# Patient Record
Sex: FEMALE | Race: WHITE | Employment: OTHER | URBAN - METROPOLITAN AREA
[De-identification: names, ages, dates, MRNs, and addresses within clinical notes are randomized per-mention and may not be internally consistent; named-entity substitution may affect disease eponyms.]

---

## 2019-11-04 ENCOUNTER — OFFICE VISIT (OUTPATIENT)
Dept: CONSULT | Facility: CLINIC | Age: 29
End: 2019-11-04
Attending: GENETIC COUNSELOR, MS
Payer: COMMERCIAL

## 2019-11-04 ENCOUNTER — OFFICE VISIT (OUTPATIENT)
Dept: CONSULT | Facility: CLINIC | Age: 29
End: 2019-11-04
Attending: PEDIATRICS
Payer: COMMERCIAL

## 2019-11-04 ENCOUNTER — OFFICE VISIT (OUTPATIENT)
Dept: CONSULT | Facility: CLINIC | Age: 29
End: 2019-11-04

## 2019-11-04 VITALS
BODY MASS INDEX: 19.18 KG/M2 | HEART RATE: 66 BPM | DIASTOLIC BLOOD PRESSURE: 66 MMHG | WEIGHT: 126.54 LBS | SYSTOLIC BLOOD PRESSURE: 105 MMHG | HEIGHT: 68 IN

## 2019-11-04 DIAGNOSIS — Z82.79 FAMILY HISTORY OF CONGENITAL OR GENETIC CONDITION: ICD-10-CM

## 2019-11-04 DIAGNOSIS — E75.21 FABRY DISEASE (H): Primary | ICD-10-CM

## 2019-11-04 DIAGNOSIS — R89.8 ABNORMAL GENETIC TEST: ICD-10-CM

## 2019-11-04 DIAGNOSIS — D23.9 ANGIOKERATOMA: ICD-10-CM

## 2019-11-04 DIAGNOSIS — D23.9 ANGIOKERATOMA: Primary | ICD-10-CM

## 2019-11-04 LAB
ALBUMIN SERPL-MCNC: 4.3 G/DL (ref 3.4–5)
ALP SERPL-CCNC: 56 U/L (ref 40–150)
ALT SERPL W P-5'-P-CCNC: 18 U/L (ref 0–50)
ANION GAP SERPL CALCULATED.3IONS-SCNC: 6 MMOL/L (ref 3–14)
AST SERPL W P-5'-P-CCNC: 15 U/L (ref 0–45)
BASOPHILS # BLD AUTO: 0 10E9/L (ref 0–0.2)
BASOPHILS NFR BLD AUTO: 0.1 %
BILIRUB SERPL-MCNC: 2.6 MG/DL (ref 0.2–1.3)
BUN SERPL-MCNC: 15 MG/DL (ref 7–30)
CALCIUM SERPL-MCNC: 9.2 MG/DL (ref 8.5–10.1)
CHLORIDE SERPL-SCNC: 103 MMOL/L (ref 94–109)
CHOLEST SERPL-MCNC: 161 MG/DL
CO2 SERPL-SCNC: 30 MMOL/L (ref 20–32)
CREAT SERPL-MCNC: 0.64 MG/DL (ref 0.52–1.04)
CREAT UR-MCNC: 123 MG/DL
DIFFERENTIAL METHOD BLD: NORMAL
EOSINOPHIL # BLD AUTO: 0.1 10E9/L (ref 0–0.7)
EOSINOPHIL NFR BLD AUTO: 1.8 %
ERYTHROCYTE [DISTWIDTH] IN BLOOD BY AUTOMATED COUNT: 12.6 % (ref 10–15)
FERRITIN SERPL-MCNC: 30 NG/ML (ref 12–150)
GFR SERPL CREATININE-BSD FRML MDRD: >90 ML/MIN/{1.73_M2}
GLUCOSE SERPL-MCNC: 91 MG/DL (ref 70–99)
HCT VFR BLD AUTO: 39.1 % (ref 35–47)
HDLC SERPL-MCNC: 72 MG/DL
HGB BLD-MCNC: 12.9 G/DL (ref 11.7–15.7)
IMM GRANULOCYTES # BLD: 0 10E9/L (ref 0–0.4)
IMM GRANULOCYTES NFR BLD: 0.1 %
IRON SATN MFR SERPL: 28 % (ref 15–46)
IRON SERPL-MCNC: 99 UG/DL (ref 35–180)
LDLC SERPL CALC-MCNC: 74 MG/DL
LYMPHOCYTES # BLD AUTO: 1.7 10E9/L (ref 0.8–5.3)
LYMPHOCYTES NFR BLD AUTO: 21.6 %
MAGNESIUM SERPL-MCNC: 2.2 MG/DL (ref 1.6–2.3)
MCH RBC QN AUTO: 29.9 PG (ref 26.5–33)
MCHC RBC AUTO-ENTMCNC: 33 G/DL (ref 31.5–36.5)
MCV RBC AUTO: 91 FL (ref 78–100)
MICROALBUMIN UR-MCNC: 11 MG/L
MICROALBUMIN/CREAT UR: 8.62 MG/G CR (ref 0–25)
MONOCYTES # BLD AUTO: 0.4 10E9/L (ref 0–1.3)
MONOCYTES NFR BLD AUTO: 5 %
NEUTROPHILS # BLD AUTO: 5.6 10E9/L (ref 1.6–8.3)
NEUTROPHILS NFR BLD AUTO: 71.4 %
NONHDLC SERPL-MCNC: 89 MG/DL
NRBC # BLD AUTO: 0 10*3/UL
NRBC BLD AUTO-RTO: 0 /100
PHOSPHATE SERPL-MCNC: 4.1 MG/DL (ref 2.5–4.5)
PLATELET # BLD AUTO: 190 10E9/L (ref 150–450)
POTASSIUM SERPL-SCNC: 3.5 MMOL/L (ref 3.4–5.3)
PROT SERPL-MCNC: 7.5 G/DL (ref 6.8–8.8)
RBC # BLD AUTO: 4.32 10E12/L (ref 3.8–5.2)
SODIUM SERPL-SCNC: 139 MMOL/L (ref 133–144)
TIBC SERPL-MCNC: 348 UG/DL (ref 240–430)
TRIGL SERPL-MCNC: 75 MG/DL
WBC # BLD AUTO: 7.8 10E9/L (ref 4–11)

## 2019-11-04 PROCEDURE — 40000738 ZZHCL STATISTIC FABRAZYME GL-3: Performed by: PEDIATRICS

## 2019-11-04 PROCEDURE — 40001087 ZZHCL STATISTIC LYSO GL3: Performed by: PEDIATRICS

## 2019-11-04 PROCEDURE — 80053 COMPREHEN METABOLIC PANEL: CPT | Performed by: PEDIATRICS

## 2019-11-04 PROCEDURE — 80061 LIPID PANEL: CPT | Performed by: PEDIATRICS

## 2019-11-04 PROCEDURE — 82306 VITAMIN D 25 HYDROXY: CPT | Performed by: PEDIATRICS

## 2019-11-04 PROCEDURE — 82043 UR ALBUMIN QUANTITATIVE: CPT | Performed by: PEDIATRICS

## 2019-11-04 PROCEDURE — 83540 ASSAY OF IRON: CPT | Performed by: PEDIATRICS

## 2019-11-04 PROCEDURE — 85025 COMPLETE CBC W/AUTO DIFF WBC: CPT | Performed by: PEDIATRICS

## 2019-11-04 PROCEDURE — 96040 ZZH GENETIC COUNSELING, EACH 30 MINUTES: CPT | Mod: ZF | Performed by: GENETIC COUNSELOR, MS

## 2019-11-04 PROCEDURE — 83735 ASSAY OF MAGNESIUM: CPT | Performed by: PEDIATRICS

## 2019-11-04 PROCEDURE — 84100 ASSAY OF PHOSPHORUS: CPT | Performed by: PEDIATRICS

## 2019-11-04 PROCEDURE — 36415 COLL VENOUS BLD VENIPUNCTURE: CPT | Performed by: PEDIATRICS

## 2019-11-04 PROCEDURE — 82728 ASSAY OF FERRITIN: CPT | Performed by: PEDIATRICS

## 2019-11-04 PROCEDURE — 83550 IRON BINDING TEST: CPT | Performed by: PEDIATRICS

## 2019-11-04 ASSESSMENT — PAIN SCALES - GENERAL: PAINLEVEL: NO PAIN (0)

## 2019-11-04 ASSESSMENT — MIFFLIN-ST. JEOR: SCORE: 1349.25

## 2019-11-04 NOTE — PROGRESS NOTES
"            Advanced Therapies  [facility] [address] [place] [address]  Phone: [phone]  Fax: [phone]  Date: 2019      Patient:  Chinyere Gonzalez   :   1990   MRN:     [id number]      Chinyere Gonzalez  [address] [place] [address]    Dear Dr. Atkins and Ms.  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    PAST MEDICAL HISTORY:    From the oral history, and medical records that are available, these items are noted:    Patient Active Problem List    Diagnosis Date Noted     Angiokeratoma 2019     Priority: Medium       Chinyere and her sister were sent because of discovery of this Fabry disease mutation, when that is usually very mild or attenuated.  In fact, Chinyere and her sister are not known to have any signs or symptoms of Fabry disease at this point in time.  Nonetheless, they are appropriately considering therapy and wanted expert opinion regarding the status of the condition, the prognosis, and therapies are currently available.    FAMILY HISTORY: A brief family medical history was reviewed.  REVIEW OF SYSTEMS: The review of systems negative for new eye, ear, heart, lung, liver, spleen, gastrointestinal, bone, muscle, integumentary, endocrinologic, brain or psychiatric issues except as noted above.  PHYSICAL EXAMINATION:   Medications:  No current outpatient medications on file.     No current facility-administered medications for this visit.        Allergies:  Allergies   Allergen Reactions     Inderal [Propranolol]      Latex        Physical Examination:  Blood pressure 105/66, pulse 66, height 5' 8.43\" (173.8 cm), weight 126 lb 8.7 oz (57.4 kg), head circumference 55.8 cm (21.97\").  Weight %tile:Normalized weight-for-age data not available for patients older " than 20 years.  Height %tile: Normalized stature-for-age data not available for patients older than 20 years.  Head Circumference %tile: No head circumference on file for this encounter.  BMI %tile: Normalized BMI data available only for age 0 to 20 years.    General: The patient is oriented to person, place and time at an age-appropriate manner.   HEENT: The facial features are normal and symmetric. The ears are of normal position and configuration and hearing is grossly normal.  The oropharynx is benign and the tongue protrudes normally without fasciculations.  Neck: The neck is supple with full range of motion  Chest: The chest is of normal configuration and clear by auscultation.   Heart: A normal, regular S1 and S2 heart sounds are heard without murmurs or gallops.  Abdomen: The abdomen is soft and benign without organomegaly.   Extremities: The extremities are of normal configuration without contractures nor hyperlaxities.   Integument: The integument is  of normal appearance without significant changes in pigmentation, birthmarks, or lesions.  Neuromuscular:  Mental Status Exam:  Alert, awake. Fully oriented. No dysarthria, no dysphasia. Speech of normal fluency.  Cranial Nerves:  PERRLA, EOMs intact, no nystagmus, facial movements symmetric. No atrophy or fasciculations.    Motor:  Normal tone in all four extremities, no atrophy or fasciculations. 5/5 strength bilaterally in shoulder abduction, elbow extensors and flexors, , hip flexors, knee extensors and flexors. No tremors.  Sensory:  Negative Romberg.  Reflexes:  2+ and symmetric in biceps, patellar, Achilles; There is no clonus at the ankles.  Gait:  Normal gait; normal arm swing and stance.ankles.    LABORATORY RESULTS: Laboratory studies from the past year were reviewed.     ASSESSMENT:  1. Finding of the A143TC mutation, a possible benign variant versus very low and delayed and often mild/attentuated symptoms of Fabry disease.  2. Currently, no  signs or concerns about Fabry disease.    PLAN/RECOMMENDATIONS:  1. Pharmacotherapy for Inherited Metabolic Disorders (PIMD) consultation with Radha Atkins PharmD.  2. No therapy at this time  3. Return to clinic in 12 months, or as needed if questions or concerns come to attention.      There will be an informational meeting with experts who are knowledgeable about your condition --- the annual [employer] event --- on the morning of Saturday, October 19, 2019 at the [facility] ([facility], [address] [place] [address]). You, and your family and friends are invited to next year's [employer] meeting! Please call Cecilia at [phone] for more information!    FOLLOW-UP INSTRUCTIONS FOR THE PATIENT:  If you are returning to clinic to review specific laboratory tests, please call the Genetic Counselor (see phone numbers below)  to confirm that we have received all of the results from reference laboratories prior to your appointment. If we have not received all of the test results, please discuss re-scheduling your appointment.    I spent 45 minutes face-to-face with the patient reviewing the chief complaint, past medical history, and obtaining a review of systems as well as doing a physical examination; more than 50% of this time was spent in counseling regarding the nature of Fabry disease, the difference between clinical manifestations in affected males versus women, the unique and complcated situation with the A143T mutation, need for ongoing evaluation, and possibility of intravenous or oral therapies if therapy is indicated..    With warmest regards,     Phuc Chauhan Ph.D., M.D.  Professor of Pediatrics  Medical Director, Advanced Therapies Program  Medical Director, PKU and Maternal PKU Clinic    Appointments: [phone]      Monday mornings: Advanced Therapies for Lysosomal Diseases Clinic   Monday afternoons: PKU Clinic, Metabolism Clinic,  and Genetics Clinic    Nurse Coordinator, Metabolism and Genetics:  Sharla Dunham, RN  [phone]    Pharmacotherapy Consultant:  Radha Atkins, PharmD, Pharmacotherapy for Metabolic Disorders (PIMD): [phone]  John Mcguire, PharmD, Pharmacotherapy for Metabolic Disorders (PIMD): [phone]    Genetic Counselor:  Alison Whitney MS, CGC (Genetic test Results): [phone]  Lexi Greenfield MS, GCG, (Genetic test results: [phone])    Metabolic Dietician:  Karime Elizondo, Registered Dietician: [phone]  Karishma Mendez, Registered Dietician: [phone]    :  BRYNN Christian, [facility], Clinical , [phone]    Advanced Therapies Clinic Scheduler:  Ting Meredith, [phone]      Copy to Primary Care Practitioner:      Copy  to patient:  Chinyere Gonzalez  [address]  [place] [address]

## 2019-11-04 NOTE — NURSING NOTE
"Chief Complaint   Patient presents with     Consult     Fabry        /66 (BP Location: Right arm, Patient Position: Sitting, Cuff Size: Adult Regular)   Pulse 66   Ht 5' 8.43\" (173.8 cm)   Wt 126 lb 8.7 oz (57.4 kg)   HC 55.8 cm (21.97\")   BMI 19.00 kg/m      Joe Carter LPN    "

## 2019-11-04 NOTE — PROGRESS NOTES
Pharmacotherapy Visit    : 1990  Date of visit: 2019      Conditions and Associated Medications    1) Fabry disease    Fabry Genotype: p.A143T    Alpha galactosidase tissue (e.g., leukocyte) activity level (unknown at this time)      Biomarkers:        Plasma GL3 (globotriaosylceramide) (mcg/ml):  (normal range is 7 mcg/ml or less)  19: pending      Lyso GL3 (normal range is < 5 mcg/ml):     19: pending      Urine protein (corrected for urine creatinine) (reference: 0-0.2 g/gCr)_:     19: pending      Urine albumin (corrected for urine creatinine)(reference: 0-25 mg/gCr):     19: pending      General background information on Fabry disease:    Fabry disease is inherited through an x-linked pattern of inheritance of Fabry disease.  Fabry disease results from mutations to the GLA gene encoding for alpha-galactosidase enzyme, the enzyme needed to metabolize GL-3 (globotriaosylceramide).  Deficiency in alpha-galactosidase enzyme activity results in accumulation of GL-3 (globotriaosylceramide) in tissues throughout the body, with most problematic accumulation often associated with kidney podocyte accumulation of GL-3, blood vessel endothelium accumulation of GL-3.  Fabry disease also affect the small fibers of nervous system tissue, the gastrointestinal tract.  GL-3 accumulation in eye tissues may cause a cornea verticillata (also known as vortex keratopathy in some patients.  The eye findings, if present, may be viewed by a slit lamp.  Clinical conditions and symptoms that may be commonly associated with Fabry disease, including peripheral neuropathic pain and paresthesias that can be exacerbated by extremes of temperature, impaired sweating, fatigue, low exercise tolerance, angiokeratomas, corneal whorling, gastrointestinal symptoms (e.g., diarrhea and/or constipation, gastrointestinal pain, nausea), heart problems (e.g., left ventricular hypertrophy), kidney function impairment  (and probable involvement of podocytes in this process), migraines, dizziness, increased risk of stroke, hearing impairment, possible depression.         Overview of therapies for Fabry disease:    Intravenous enzyme replacement therapy (ERT):  The only FDA approved intravenous enzyme replacement therapy (ERT) is Fabrazyme (agalsidase beta), which is administered as an IV infusion, at 1 mg/kg/dose, once every 2 weeks. We also discussed that another ERT, Replagal (agalsidase alpha, made by [employer]), is available in [place]. Although Replagal and Fabrazyme are thought to be very similar, there are some differences that are notable. Fabrazyme is produced by a recombinant DNA technology using a cell line from a Chinese hamster ovary cell (CHO) from 1957.  Replagal is a humanized ERT, produce by a gene activation method using a human sarcoma cell line, [id number] which has been available since May 2000. Fabrazyme is dosed at 1 mg/kg/dose once every 2 weeks, which Replagal is dose at 0.2 mg/kg/dose every 2 weeks. The optimal dosing of ERT for Fabry disease has not been fully elucidated ed and it is not establised whether gene activation of a human cell line to create an ERT identical to the human agalsidase enzyme offers and advantage over CHO recombinant DNA derived ERT.   Pengunigalsidase Alpha (PRX-102) is a pegylated ERT that is made from a carrot cell line. It is currently in clinical trials ([facility] is a site for the clinical trial). The formulation is hypothesized to have improved cellular uptake and improved tissue distribution compared to the current licensed ERTs (Fabrazyme and Replagal). The pegylation is thought to prolong plasma half-life to such a degree, that pengunigalsidase may only half to be administered every 4 weeks (instead of every 2 weeks).  Side effects and symptoms of infusion reactions to ERT include acute reactions, delayed reactions, or bi-phasic reactions.  Anti-ERT antibodies to  ERT can develop.  Anti-ERT antibodies are usually considered to not interfere significantly with efficacy of ERT, but may increase risk of infusion reactions.  On rare occasions, patients may develop a neutralizing anti-ERT.  Anti-ERT antibodies are thought to be able to decrease efficacy of ERT by a number of possible mechanisms, including reduced enzyme stability and enzyme degadation in the blood stream, antibody-mediated blockade of M6P receptor cellular uptake of ERT, retargeting of enzyme to macrophages, intracellular misrouting of ERT.   If neutralizing anti-ERT antibodies form, then special treatment protocols may be put in place to eradicate the neutralizing anti-ERT antibodies.  (References:  Mervat FLOYD. Rossi CAZARES. Keyon BERNABE. Micheline BERNABE. Libra BERNABE. Navin ALANIS, Carrie BEATTY. Non-inhibitory antibodies impeded lysosomal storage reduction during enzyme replacement therapy of a lysosomal storage disease. Journal of Molecular Medicine. 86(4):433-42, 2008 Apr.; [place] CT. Stalin ARMIJO. Felipe GROVES. Nadir ALICEA.  Immune response to enzyme replacement therapy: 4-sulfatase epitope reactivity of plasma antibodies from MPS VI cats. Molecular Genetics and Metabolism. 67(3):194-205, 1999 Jul.)  We have discussed the rationale for using a slower ERT infusion versus a faster infusion, specifically to minimize risk of saturating the non-hepatic M6P receptors and thereby increase cellular uptake of ERT, as well as helping to minimize risk of infusion reactions. We also discussed the advantages and disadvantages of using and implanted port central line versus a peripherally accessed central line for ERT infusions.   B) Chaperone therapy: An oral medication just recently approved in [place] for Fabry disease, and for which FDA approval was received on 08/08/18 is called Galafold (generic name is migalastat, also referred to as [id number]).  This medication is made by [employer], and which functions as a mutation specific  pharmaceutical chaperone for residual alpha-galactosidase, to protect the residual enzyme from proteolytic degradation while increasing its half-life and enzyme activity.  It has demonstrated beneficial acitivity for 269 Fabry mutations. Juany s GAA mutation is amenable to migalastat.   .  C) Substrate reduction therapy (SRT): Eliglustat tartrate (or [id number], by [employer]), an inhibitor of a step in the glycosphingolipid pathway, in currently in clinical trials as a substrate reduction therapy for Gaucher disease, but has also been shown in mice to also reduce accumulation of globotriaosylceramide in Fabry disease mice.   D) Lucerstat is a substrate reducing therapy that may enter clinical trials in 2017 or 2018.  This agent belongs to [employer].  E) Some early research is being done on gene therapy for Fabry disease.     2) Cardiology    Chinyere had heart echo in 2016 which was normal;  Dr. Chauhan would like her to also have a cardiac MRI and EKG.    3) Neuropathic pain:    Chinyere said she does not think she has neuropathic pain, but she said she does have Raynauds syndrome in such cold weather her feet feel numb.  If she jumps in a hot shower, especially if her feet were cold, she will feel burning.      4) Headaches    Chinyere denies headaches, other than sinus headaches related to allergies    Headaches, especially migraines, are becoming increasingly recognized as being a common complaint in patients with Fabry disease.  White matter lesions like those found in patients suffering from recurrent migraine headaches, are often seen on brain MRI imaging in patients with Fabry disease.  Additionally, patients with Fabry disease often develop tortuosity of the blood vessels, including basilar artery tortuosity.  This cererbral vascular diseases may lead to increase risk of symptoms resembling a dolichoectasia like presentation, including vision changes, episodes of paresis and nolvia-facial muscle  weakness and other one-sided weaknesses, dizziness and headaches. In the general population, dolichoectasia with basilar artery tortuosity can lead to increased risk of TIA and stroke.  We discussed these possibilities. Although potential therapies for such symptoms are scant, we discussed using statin therapy to improve cerebral vascular flow and cerebral vascular reactivity and to learn if this might also help with mitigating headaches.     (References:      Annita M, Dominique U, Annie R, Radhames B, Roosevelt C, Sabine I, Juliocesar A, Justin M.The early clinical phenotype of Fabry disease: a study on 35  children and adolescents.  Dominic B, Vincent L, Del Patrizia M, Wendy C, Brenda G, Pieter C. Eur J Pediatr. 2003 Nov;162(11):767-72.   Fabry disease in patients with migraine with aura.  Neurol Sci. 2010 Yossi;31 Suppl 1:S167-9.   SUE Salas. Can we use statins to prevent stroke in Fabry disease? J Inherit Metab Dis (2009) 32: 481.    V Purvin, A Kawasaki, S Zeldes. Dolichoectatic arterial compression of the anterior visual pathways: neuro-ophthalmic features and clinical course. J Neurol Neurosurg Psychiatry 2004;75:27-32.  Humaira R, Fatuma RY, Jameso S, Haley V, Virgilio G, Efrain F, Karol M, Thad DP, Rosey MOSQUERA. Basilar Artery Changes in Fabry Disease. AJNR Am J Neuroradiol. 2017 Mar;38(3):531-536.).      5) No gastrointestinal symptoms:    Chinyere said her father, who has the Fabry mutation p.A143T, has stomach discomfort  and fast transit time, with diarrhea, after eating meals    6) Vitamin D status    Will check vitaimin D status today    Darryl et al. (2014) found in a study of 111 adult patients (ages 27-53 years) with Fabry diseases who did nto have severe renal function impairment, an association between vitamin D deficiency in patients with Fabry disease and higher lean left ventricular (LV) mass, higher median levels of proteinuria, prevalence of depression, hiwot, cornea verticillata and need  for medical pain therapy. Patients with Fabry disease who had moderate to overt vitamin D deficiency had more cardiac hypertrophy than patients with sufficient vitamin D status. The authors conclulded that vitamin D deficiency was strongly associated with cardiomyopathy and adverse clinical symptomsin pateints with Fabry disease.       Aside from development of severe renal function impairments that can interfere with normal vitamin homeostasis, patients with Fabry disease who do not have severe renal function impairment may have a higher prevalence of vitamin D deficiency compared to the general population.  This may be due to difficulties with sweating and heat intolerance, Fabry patients may tend to avoid sunlight and this may contribute to vitamin D defiencies.  Malabsorption problems cause by Fabry disease may also contribute to vitamin D deficiency.    Reference:  Darryl C, Deedee B, Mars PLASENCIA, Deedee D, Lissette J, Sharon I, Kamran K, Maria Luisa A, Demetrio S, Isabelle B, Loretta F, Caity F, Baltazar C. Potential role of vitamin D deficiency on Fabry cardiomyopathy.  J Inherit Metab Dis. 2014 Mar;37(2):289-95.      8) Fatigue associated with periods of low iron:    Chinyere said sometimes her iron is low and this can make her fatigued.  She is currently not on any iron supplement, but has been on iron supplementation in the past    Chronic fatigue is now recognized as a common condition in patients with Fabry disease.  Treatment with enzyme replacement therapy often helps to reduce the severeity of fatigue symtpoms, but may not resolve chronic fatigue symptoms.     (Malathi T, Caden M, Keaton S, Dianna H, Minna J, Sang PLASENCIA, Toni R, Balaji P. Excessive Daytime Sleepiness Is a Common Symptom in Fabry Disease. Case Rep Neurol. 2009 Jul 22;1(1):33-40.); Meli MG, Cinda SM, Lisa E, Nestor A, Iéns FA, Radha CE, Que GE.  Prevalence of symptoms in female Fabry disease patients: a  case-control survey.   J Inherit Metab Dis. 2012 Sep;35(5):891-8. doi: 10.1007/i68518-573-1949-6. Epub 2012 Mar 20.)    9) Contraception:  Mirena    10) Raynauds syndrome:    Chinyere said her feet are affected by Raynauds.  They get cold and numb easily and have burning pain if she puts cold feet in a hot shower.        PHARMACOTHERAPY PLAN:       1) Maintain regular follow-up visits with cardiologist to monitor for hypertrophic cardiomyopathy and follow-up as needed with nephrologist to monitor for changes in kidney function associated with Fabry disease.   2) Will recommend and make referral for regular regular cardiology following by Dr. Beth Ivy or Dr. Seth Scales  3) Will recommend and make referral for regular regular nephrology following as neede  by Bryan Crabtree  4) Continue monitoring the following labs:  GL3 (have requested plasma GL3, or globotyriaosylceramide, and LYSO GL3, or globotriaosylsphingosine, on the requisition form.  The same blood sample can be used for both labs.  CBC with platelets and differential  Comprehensive metabolic panel  Vitamin D level  Urine albumin (normalized for urine creatinine)  Urine protein (normalized for urine creatinine)  Lipid panel (non-fasting)  Iron panel    Radha Atkins, PharmD, Pharmcotherapy

## 2019-11-04 NOTE — LETTER
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  Fabry disease results from mutations to the GLA gene encoding for alpha-galactosidase enzyme, the enzyme needed to metabolize GL-3 (globotriaosylceramide).  Deficiency in alpha-galactosidase enzyme activity results in accumulation of GL-3 (globotriaosylceramide) in tissues throughout the body, with most problematic accumulation often associated with kidney podocyte accumulation of GL-3, blood vessel endothelium accumulation of GL-3.  Fabry disease also affect the small fibers of nervous system tissue, the gastrointestinal tract.  GL-3 accumulation in eye tissues may cause a cornea verticillata (also known as vortex keratopathy in some patients.  The eye findings, if present, may be viewed by a slit lamp.  Clinical conditions and symptoms that may be commonly associated with Fabry disease, including peripheral neuropathic pain and paresthesias that can be exacerbated by extremes of temperature, impaired sweating, fatigue, low exercise tolerance, angiokeratomas, corneal whorling, gastrointestinal symptoms (e.g., diarrhea and/or constipation, gastrointestinal pain, nausea),  heart problems (e.g., left ventricular hypertrophy), kidney function impairment (and probable involvement of podocytes in this process), migraines, dizziness, increased risk of stroke, hearing impairment, possible depression.         Overview of therapies for Fabry disease:    Intravenous enzyme replacement therapy (ERT):  The only FDA approved intravenous enzyme replacement therapy (ERT) is Fabrazyme (agalsidase beta), which is administered as an IV infusion, at 1 mg/kg/dose, once every 2 weeks. We also discussed that another ERT, Replagal (agalsidase alpha, made by [employer]), is available in [place]. Although Replagal and Fabrazyme are thought to be very similar, there are some differences that are notable. Fabrazyme is produced by a recombinant DNA technology using a cell line from a Chinese hamster ovary cell (CHO) from 1957.  Replagal is a humanized ERT, produce by a gene activation method using a human sarcoma cell line, HT-1080 which has been available since May 2000. Fabrazyme is dosed at 1 mg/kg/dose once every 2 weeks, which Replagal is dose at 0.2 mg/kg/dose every 2 weeks. The optimal dosing of ERT for Fabry disease has not been fully elucidated ed and it is not establised whether gene activation of a human cell line to create an ERT identical to the human agalsidase enzyme offers and advantage over CHO recombinant DNA derived ERT.   Pengunigalsidase Alpha (PRX-102) is a pegylated ERT that is made from a carrot cell line. It is currently in clinical trials ([facility] is a site for the clinical trial). The formulation is hypothesized to have improved cellular uptake and improved tissue distribution compared to the current licensed ERTs (Fabrazyme and Replagal). The pegylation is thought to prolong plasma half-life to such a degree, that pengunigalsidase may only half to be administered every 4 weeks (instead of every 2 weeks).  Side effects and symptoms of infusion reactions to ERT include acute  reactions, delayed reactions, or bi-phasic reactions.  Anti-ERT antibodies to ERT can develop.  Anti-ERT antibodies are usually considered to not interfere significantly with efficacy of ERT, but may increase risk of infusion reactions.  On rare occasions, patients may develop a neutralizing anti-ERT.  Anti-ERT antibodies are thought to be able to decrease efficacy of ERT by a number of possible mechanisms, including reduced enzyme stability and enzyme degadation in the blood stream, antibody-mediated blockade of M6P receptor cellular uptake of ERT, retargeting of enzyme to macrophages, intracellular misrouting of ERT.   If neutralizing anti-ERT antibodies form, then special treatment protocols may be put in place to eradicate the neutralizing anti-ERT antibodies.  (References:  Mervat FLOYD. Rossi CAZARES. Keyon BERNABE. Micheline BERNABE. Libra BERNABE. Carrie Mukherjee. Non-inhibitory antibodies impeded lysosomal storage reduction during enzyme replacement therapy of a lysosomal storage disease. Journal of Molecular Medicine. 86(4):433-42, 2008 Apr.; [place] CT. Stalin ARMIJO. Felipe GROVES. Nadir ALICEA.  Immune response to enzyme replacement therapy: 4-sulfatase epitope reactivity of plasma antibodies from MPS VI cats. Molecular Genetics and Metabolism. 67(3):194-205, 1999 Jul.)  We have discussed the rationale for using a slower ERT infusion versus a faster infusion, specifically to minimize risk of saturating the non-hepatic M6P receptors and thereby increase cellular uptake of ERT, as well as helping to minimize risk of infusion reactions. We also discussed the advantages and disadvantages of using and implanted port central line versus a peripherally accessed central line for ERT infusions.   B) Chaperone therapy: An oral medication just recently approved in [place] for Fabry disease, and for which FDA approval was received on 08/08/18 is called Galafold (generic name is migalastat, also referred to as FK2010).  This medication is made  by [facility] and [employer], and which functions as a mutation specific pharmaceutical chaperone for residual alpha-galactosidase, to protect the residual enzyme from proteolytic degradation while increasing its half-life and enzyme activity.  It has demonstrated beneficial acitivity for 269 Fabry mutations. Juany s GAA mutation is amenable to migalastat.   .  C) Substrate reduction therapy (SRT): Eliglustat tartrate (or [id number], by [employer]), an inhibitor of a step in the glycosphingolipid pathway, in currently in clinical trials as a substrate reduction therapy for Gaucher disease, but has also been shown in mice to also reduce accumulation of globotriaosylceramide in Fabry disease mice.   D) Lucerstat is a substrate reducing therapy that may enter clinical trials in 2017 or 2018.  This agent belongs to [employer].  E) Some early research is being done on gene therapy for Fabry disease.     2) Cardiology    Chinyere had heart echo in 2016 which was normal;  Dr. Chauhan would like her to also have a cardiac MRI and EKG.    3) Neuropathic pain:    Chinyere said she does not think she has neuropathic pain, but she said she does have Raynauds syndrome in such cold weather her feet feel numb.  If she jumps in a hot shower, especially if her feet were cold, she will feel burning.      4) Headaches    Chinyere denies headaches, other than sinus headaches related to allergies    Headaches, especially migraines, are becoming increasingly recognized as being a common complaint in patients with Fabry disease.  White matter lesions like those found in patients suffering from recurrent migraine headaches, are often seen on brain MRI imaging in patients with Fabry disease.  Additionally, patients with Fabry disease often develop tortuosity of the blood vessels, including basilar artery tortuosity.  This cererbral vascular diseases may lead to increase risk of symptoms resembling a dolichoectasia like  presentation, including vision changes, episodes of paresis and nolvia-facial muscle weakness and other one-sided weaknesses, dizziness and headaches. In the general population, dolichoectasia with basilar artery tortuosity can lead to increased risk of TIA and stroke.  We discussed these possibilities. Although potential therapies for such symptoms are scant, we discussed using statin therapy to improve cerebral vascular flow and cerebral vascular reactivity and to learn if this might also help with mitigating headaches.     (References:      Annita M, Ramphillip U, Annie R, Radhames B, Roosevelt C, Sabine I, Juliocesar A, Justin M.The early clinical phenotype of Fabry disease: a study on 35  children and adolescents.  Dominic B, Vincent L, Del Patrizia M, Wendy C, Brenda G, Pieter C. Eur J Pediatr. 2003 Nov;162(11):767-72.   Fabry disease in patients with migraine with aura.  Neurol Sci. 2010 Yossi;31 Suppl 1:S167-9.   SUE Salas. Can we use statins to prevent stroke in Fabry disease? J Inherit Metab Dis (2009) 32: 481.    V Purvin, A Kawasaki, S Zeldes. Dolichoectatic arterial compression of the anterior visual pathways: neuro-ophthalmic features and clinical course. J Neurol Neurosurg Psychiatry 2004;75:27-32.  Humaira R, Fatuma RY, Jaemso S, Haley V, Virgilio G, Efrain F, Karol M, Thad DP, Rosey A. Basilar Artery Changes in Fabry Disease. AJNR Am J Neuroradiol. 2017 Mar;38(3):531-536.).      5) No gastrointestinal symptoms:    Chinyere said her father, who has the Fabry mutation p.A143T, has stomach discomfort  and fast transit time, with diarrhea, after eating meals    6) Vitamin D status    Will check vitaimin D status today    Darryl et al. (2014) found in a study of 111 adult patients (ages 27-53 years) with Fabry diseases who did nto have severe renal function impairment, an association between vitamin D deficiency in patients with Fabry disease and higher lean left ventricular (LV) mass, higher median  levels of proteinuria, prevalence of depression, hiwot, cornea verticillata and need for medical pain therapy. Patients with Fabry disease who had moderate to overt vitamin D deficiency had more cardiac hypertrophy than patients with sufficient vitamin D status. The authors conclulded that vitamin D deficiency was strongly associated with cardiomyopathy and adverse clinical symptomsin pateints with Fabry disease.       Aside from development of severe renal function impairments that can interfere with normal vitamin homeostasis, patients with Fabry disease who do not have severe renal function impairment may have a higher prevalence of vitamin D deficiency compared to the general population.  This may be due to difficulties with sweating and heat intolerance, Fabry patients may tend to avoid sunlight and this may contribute to vitamin D defiencies.  Malabsorption problems cause by Fabry disease may also contribute to vitamin D deficiency.    Reference:  Darryl BERNABE, Deedee B, Mars PLASENCIA, Deedee D, Lissette J, Sharon I, Kamran K, Maria Luisa A, Demetrio S, Isabelle B, Loretta F, Caity F, Baltazar C. Potential role of vitamin D deficiency on Fabry cardiomyopathy.  J Inherit Metab Dis. 2014 Mar;37(2):289-95.      8) Fatigue associated with periods of low iron:    Chinyere said sometimes her iron is low and this can make her fatigued.  She is currently not on any iron supplement, but has been on iron supplementation in the past    Chronic fatigue is now recognized as a common condition in patients with Fabry disease.  Treatment with enzyme replacement therapy often helps to reduce the severeity of fatigue symtpoms, but may not resolve chronic fatigue symptoms.     (Malathi T, Caden M, Keaton S, Dianna H, Minna J, Sang PLASENCIA, Toni R, Balaji P. Excessive Daytime Sleepiness Is a Common Symptom in Fabry Disease. Case Rep Neurol. 2009 Jul 22;1(1):33-40.); Meli MG, Cinda SM, Lisa E, Nestor A, Inés BAXTER,  Radha OLIVA, Que BRAVO.  Prevalence of symptoms in female Fabry disease patients: a case-control survey.   J Inherit Metab Dis. 2012 Sep;35(5):891-8. doi: 10.1007/a13040-822-4686-8. Epub 2012 Mar 20.)    9) Contraception:  Mirena    10) Raynauds syndrome:    Chinyere said her feet are affected by Raynauds.  They get cold and numb easily and have burning pain if she puts cold feet in a hot shower.        PHARMACOTHERAPY PLAN:       1) Maintain regular follow-up visits with cardiologist to monitor for hypertrophic cardiomyopathy and follow-up as needed with nephrologist to monitor for changes in kidney function associated with Fabry disease.   2) Will recommend and make referral for regular regular cardiology following by Dr. Bteh Ivy or Dr. Seth Scales  3) Will recommend and make referral for regular regular nephrology following as neede  by Bryan Crabtree  4) Continue monitoring the following labs:  GL3 (have requested plasma GL3, or globotyriaosylceramide, and LYSO GL3, or globotriaosylsphingosine, on the requisition form.  The same blood sample can be used for both labs.  CBC with platelets and differential  Comprehensive metabolic panel  Vitamin D level  Urine albumin (normalized for urine creatinine)  Urine protein (normalized for urine creatinine)  Lipid panel (non-fasting)  Iron panel    Radha Atkins, PharmD, Pharmcotherapy              Radha Atkins, [facility]

## 2019-11-04 NOTE — LETTER
Date:November 5, 2019      Patient was self referred, no letter generated. Do not send.        [facility] Information

## 2019-11-04 NOTE — LETTER
"  2019      RE: Chinyere Gonzalez  [address] [place] [address]                   Advanced Therapies  [facility] [address] [place] [address]  Phone: [phone]  Fax: [phone]  Date: 2019      Patient:  Chinyere Gonzalez   :   1990   MRN:     [id number]      Chinyere Gonzalez  [address] [place] [address]    Dear Dr. Atkins and Ms.  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    PAST MEDICAL HISTORY:    From the oral history, and medical records that are available, these items are noted:    Patient Active Problem List    Diagnosis Date Noted     Angiokeratoma 2019     Priority: Medium       Chinyere and her sister were sent because of discovery of this Fabry disease mutation, when that is usually very mild or attenuated.  In fact, Chinyere and her sister are not known to have any signs or symptoms of Fabry disease at this point in time.  Nonetheless, they are appropriately considering therapy and wanted expert opinion regarding the status of the condition, the prognosis, and therapies are currently available.    FAMILY HISTORY: A brief family medical history was reviewed.  REVIEW OF SYSTEMS: The review of systems negative for new eye, ear, heart, lung, liver, spleen, gastrointestinal, bone, muscle, integumentary, endocrinologic, brain or psychiatric issues except as noted above.  PHYSICAL EXAMINATION:   Medications:  No current outpatient medications on file.     No current facility-administered medications for this visit.        Allergies:  Allergies   Allergen Reactions     Inderal [Propranolol]      Latex        Physical Examination:  Blood pressure 105/66, pulse 66, height 5' 8.43\" (173.8 cm), weight 126 lb 8.7 oz (57.4 kg), head circumference 55.8 cm " "(21.97\").  Weight %tile:Normalized weight-for-age data not available for patients older than 20 years.  Height %tile: Normalized stature-for-age data not available for patients older than 20 years.  Head Circumference %tile: No head circumference on file for this encounter.  BMI %tile: Normalized BMI data available only for age 0 to 20 years.    General: The patient is oriented to person, place and time at an age-appropriate manner.   HEENT: The facial features are normal and symmetric. The ears are of normal position and configuration and hearing is grossly normal.  The oropharynx is benign and the tongue protrudes normally without fasciculations.  Neck: The neck is supple with full range of motion  Chest: The chest is of normal configuration and clear by auscultation.   Heart: A normal, regular S1 and S2 heart sounds are heard without murmurs or gallops.  Abdomen: The abdomen is soft and benign without organomegaly.   Extremities: The extremities are of normal configuration without contractures nor hyperlaxities.   Integument: The integument is  of normal appearance without significant changes in pigmentation, birthmarks, or lesions.  Neuromuscular:  Mental Status Exam:  Alert, awake. Fully oriented. No dysarthria, no dysphasia. Speech of normal fluency.  Cranial Nerves:  PERRLA, EOMs intact, no nystagmus, facial movements symmetric. No atrophy or fasciculations.    Motor:  Normal tone in all four extremities, no atrophy or fasciculations. 5/5 strength bilaterally in shoulder abduction, elbow extensors and flexors, , hip flexors, knee extensors and flexors. No tremors.  Sensory:  Negative Romberg.  Reflexes:  2+ and symmetric in biceps, patellar, Achilles; There is no clonus at the ankles.  Gait:  Normal gait; normal arm swing and stance.ankles.    LABORATORY RESULTS: Laboratory studies from the past year were reviewed.     ASSESSMENT:  1. Finding of the A143TC mutation, a possible benign variant versus very " low and delayed and often mild/attentuated symptoms of Fabry disease.  2. Currently, no signs or concerns about Fabry disease.    PLAN/RECOMMENDATIONS:  1. Pharmacotherapy for Inherited Metabolic Disorders (PIMD) consultation with Radha Atkins PharmD.  2. No therapy at this time  3. Return to clinic in 12 months, or as needed if questions or concerns come to attention.      There will be an informational meeting with experts who are knowledgeable about your condition --- the annual [employer] event --- on the morning of Saturday, October 19, 2019 at the [facility] ([facility], [address] [place] [address]). You, and your family and friends are invited to next year's [employer] meeting! Please call Cecilia at [phone] for more information!    FOLLOW-UP INSTRUCTIONS FOR THE PATIENT:  If you are returning to clinic to review specific laboratory tests, please call the Genetic Counselor (see phone numbers below)  to confirm that we have received all of the results from reference laboratories prior to your appointment. If we have not received all of the test results, please discuss re-scheduling your appointment.    I spent 45 minutes face-to-face with the patient reviewing the chief complaint, past medical history, and obtaining a review of systems as well as doing a physical examination; more than 50% of this time was spent in counseling regarding the nature of Fabry disease, the difference between clinical manifestations in affected males versus women, the unique and complcated situation with the A143T mutation, need for ongoing evaluation, and possibility of intravenous or oral therapies if therapy is indicated..    With warmest regards,     Phuc Chauhan Ph.D., M.D.  Professor of Pediatrics  Medical Director, Advanced Therapies Program  Medical Director, PKU and Maternal PKU Clinic    Appointments: [phone]      Monday mornings: Advanced  Therapies for Lysosomal Diseases Clinic   Monday afternoons: PKU Clinic, Metabolism Clinic, and Genetics Clinic    Nurse Coordinator, Metabolism and Genetics:  Sharla Dunham RN  [phone]    Pharmacotherapy Consultant:  Radha Atkins, PharmD, Pharmacotherapy for Metabolic Disorders (PIMD): [phone]  John Mcguire, PharmD, Pharmacotherapy for Metabolic Disorders (PIMD): [phone]    Genetic Counselor:  Alison Whitney MS, [facility] (Genetic test Results): [phone]  Lexi Greenfield MS, GC, (Genetic test results: [phone])    Metabolic Dietician:  Karime Elizondo, Registered Dietician: [phone]  Karishma Mendez, Registered Dietician: [phone]    :  BRYNN Christian, [facility], Clinical , [phone]    Advanced Therapies Clinic Scheduler:  Ting Meredith, [phone]      Copy to Primary Care Practitioner:      Copy  to patient:  Chinyere Gonzalez  [address] [place] [address]

## 2019-11-05 LAB — DEPRECATED CALCIDIOL+CALCIFEROL SERPL-MC: 37 UG/L (ref 20–75)

## 2019-11-06 NOTE — PROGRESS NOTES
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  Chinyere was seen today to establish care with Dr. Phuc Chauhan.  Chinyere brought her three sons with her to her appointment today.  I met with the family at the request of Dr. Chauhan to obtain a personal and family history, review the genetics and inheritance of Fabry disease, and to work to obtain additional medical records.      Personal History:  Chinyere reports being healthy with no known health problems related to Fabry disease.  She does report Reynaud's phenomena and heat intolerance, and noted tingling in her hands during pregnancy.  Urinalysis and echocardiograms have been normal for Chinyere.  She has one small red ender on her stomach that may be an angiokeratoma.  She has not had genetic testing for the familial GLA variant but is an obligate carrier based on family testing.     Family History:  A detailed pedigree was obtained and scanned into the electronic medical record.  It is significant for the following:     Chinyere has three sons, ages 6, 4, and 20-months.  Chinyere's middle son had a positive [place]  screen for Fabry disease, and was found to carry the A143T variant.  Chinyere's older son was then tested and also found to have this variant.  Chinyere's youngest son reportedly had negative enzyme analysis.      Chinyere has two sisters who are also obligate carriers; one has a history of mitral valve prolapse and Reynaud's, one had a history of seizures.      None of Chinyere's nieces or nephews have had testing for the familial variant.      Chinyere's mother is 58-years-old and generally healthy.      Chinyere's father is 60-years-old.  He was found to have the A143T variant.  This man has a history of a triple bypass performed around age 51, a \"silent heart attack\" occurring in his 50s, and a stroke at age 52.  He also has significant GI symptoms.  " "He is overweight and has type 2 diabetes.       Chinyere is of [place], [place], [place], and  ancestry on his maternal side and unknown  ancestry on his paternal side.  Consanguinity was denied.     Discussion:  As the family knows, genes are long stretches of DNA that are responsible for how our bodies look and how our bodies work.  Our genes are inherited on structures called chromosomes of which we have 23 pairs.  The first 22 pairs are the same in males and females while the 23rd pair, the sex chromosomes (X and Y), are different in males and females.  Males have one copy of the X-chromosome and one copy of the Y-chromosome while females have two copies of the X-chromosome.  Changes in the DNA sequence of a gene, called mutations, as well as changes within the chromosomes can cause the signs and symptoms of a genetic condition.      Fabry disease is a genetic condition caused by mutations in a gene called GLA which is found on the X-chromosome. This gene codes for a lysosomal protein called alpha-galactosidase A. This enzyme is normally responsible for breaking down globotriaosylceramide in the cells. Mutations in the GLA gene, therefore, disrupt this degradation process and lead to the accumulation of globotriaosylceramide in the cells. This damages cells and can lead to the signs and symptoms of Fabry disease.    Women have two copies of the X-chromosome while men have one copy of the X-chromosome and one copy of the Y-chromosome. Therefore women have two copies of the GLA gene while men have just one. Because women have this \"back-up\" copy of the GLA gene, a GLA mutation does not typically affect females to the same degree as it does males.     Whether a female experiences symptoms and how severe they are is at least partially determined by X-inactivation. In females, one copy of the X-chromosome is de-activated or \"turned off\" in every cell. On average, half of the cells will have one " X-chromosome inactivated and the other half of cells will have the opposite X-chromosome inactivated. However, some women or even certain tissue types have one chromosome preferentially de-activated which can affect phenotype.     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  The notation of this variant refers to its location in the gene and its effect on the subsequent protein.  The clinical significance of this variant is unclear.  It was previously thought to be a disease causing mutation, and does result in reduced enzyme function in vitro.  However, subsequent studies have questioned whether this may be a pseudodeficiency allele that does not result in symptoms.  At present it appears this variant does not cause classic Fabry disease but may be associated with late-onset or atypical disease.       Because of the possible risk for symptoms, we do recommend individuals with this gene variant be followed by an expert in Fabry disease like Dr. Chauhan, and monitored for any symptoms through echocardiogram and urinalysis.  Whether more invasive screening recommendations (ie kidney biopsy) or enzyme replacement therapy should be considered is less clear.      Today we also discussed the availability of the new oral medical for Fabry disease called Migalastat (Galafold).  This is a medication that only works for individuals with certain GLA mutations.  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  Treatment with this medication therefore would be available to Chinyere if she is determined to be at risk for symptoms.  It is not currently approved for use in children <18 years old.      Additional records were not available for our review today, and release of information forms were signed.  I will work to obtain these records, which will then be scanned into the electronic medical record.  It was a pleasure meeting with Chinyere and her family and my contact information was  shared should they have additional questions or concerns.     Plan:  1.  I will work to request medical records for Chinyere and her family   2.  Additional evaluations and labs as recommended by Dr. Chauhan   3.  Follow-up as recommended by Dr. Gissel Rosas [facility]  Genetic Counselor  Division of Genetics and Metabolism    Total time spent in consultation with the family was approximately 35 minutes    Cc: No letter

## 2019-12-31 LAB
LAB SCANNED RESULT: NORMAL
LAB SCANNED RESULT: NORMAL

## 2020-10-25 PROBLEM — E75.21 FABRY DISEASE (H): Status: ACTIVE | Noted: 2020-10-25

## 2020-10-26 ENCOUNTER — VIRTUAL VISIT (OUTPATIENT)
Dept: PEDIATRICS | Facility: CLINIC | Age: 30
End: 2020-10-26
Attending: PEDIATRICS
Payer: COMMERCIAL

## 2020-10-26 DIAGNOSIS — E75.21 FABRY DISEASE (H): Primary | ICD-10-CM

## 2020-10-26 PROCEDURE — 99215 OFFICE O/P EST HI 40 MIN: CPT | Mod: 95 | Performed by: PEDIATRICS

## 2020-10-26 NOTE — PROGRESS NOTES
"Chinyere Gonzalez is a 30 year old female who is being evaluated via a billable video visit.      The patient has been notified of following:     \"This video visit will be conducted via a call between you and your physician/provider. We have found that certain health care needs can be provided without the need for an in-person physical exam.  This service lets us provide the care you need with a video conversation.  If a prescription is necessary we can send it directly to your pharmacy.  If lab work is needed we can place an order for that and you can then stop by our lab to have the test done at a later time.    Video visits are billed at different rates depending on your insurance coverage.  Please reach out to your insurance provider with any questions.    If during the course of the call the physician/provider feels a video visit is not appropriate, you will not be charged for this service.\"    Patient has given verbal consent for Video visit? Yes  How would you like to obtain your AVS? Mail a copy  If you are dropped from the video visit, the video invite should be resent to: Send to e-mail at: [email or web address]  Will anyone else be joining your video visit? John Mcguire, Pharm.D., MICHAEL Baltazar, MICHAEL Trejo, EMT    Video Start: 1:00 pm  Video End:  1:36 pm                [facility]  [address] [address]   Phone: [phone]  Fax: [phone]  Date: 2020      Patient:  Chinyere Gonzalez   :   1990   MRN:     [id number]      Chinyere Gonzalez  [address] [place] [address]    Dear Ms. Chinyere Gonzalez,    Thank you for Attending,  Chinyere Gonzalez, to the [facility] Monday \"[facility] Clinic\" for consultation and treatment of:    1. 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  She has a documented mutation of A143T and is not on any medication therapies for this condition.  She has no angiokeratomas or GI issues.  She states that she is intolerant to heat and that her feet get numb in the winter due to the cold weather.  She has not issues with pain.  She moved to [place] three years ago and had an EKG performed in [place] in 2016.  She has had no hospitalizations or ED visits since our last visit.        Medications:  No current outpatient medications on file.     No current facility-administered medications for this visit.        Allergies:  Allergies   Allergen Reactions     Inderal [Propranolol]      Latex        Physical Examination:  There were no vitals taken for this visit.  Weight %tile:Facility age limit for growth percentiles is 20 years.  Height %tile: Facility age limit for growth percentiles is 20 years.  Head Circumference %tile: Facility age limit for growth percentiles is 20 years.  BMI %tile: No height and weight on file for this encounter.    FAMILY HISTORY: A brief family medical history was reviewed.  REVIEW OF SYSTEMS: The review of systems negative for new eye, ear, heart, lung, liver, spleen, gastrointestinal, bone, muscle, integumentary, endocrinologic, brain or psychiatric issues except as noted above.  PHYSICAL EXAMINATION:   General: The patient is oriented to person, place and time at an age-appropriate manner.   HEENT: The facial features are normal and symmetric. The ears are of normal position and configuration and hearing is grossly normal.  The lips and the tongue protrudes normally without fasciculations.  Neck: The neck appears to be supple with full range of motion  Chest: The chest is of normal configuration.  Heart: The patient is in no apparent distress.  Abdomen: Not examined.  Extremities: The extremities are of normal  configuration.  Back: Not examined.  Integument: The integument is  of normal appearance without significant changes in pigmentation, birthmarks, or lesions.  Neuromuscular:  Mental Status Exam: Alert, awake. Fully oriented. No dysarthria, no dysphasia. Speech of normal fluency.  Cranial Nerves: PERRLA, EOMs intact, no nystagmus, facial movements symmetric.  Motor: Normal tone in all four extremities, no atrophy or fasciculations. No tremors.  Sensory: Not examined.  Reflexes: Not examined.  Gait: Not examined.    LABORATORY RESULTS: Laboratory studies from the past year were reviewed.  Labs from 04 November 2019:  Plasma GL3 (reference range 1.37-4.04 mcg/mL):  2.88  Lyso GL3 (<0.3 ng/mL):  BQL  Albumin Urine (0-25 mg/g Cr):  8.62  ASSESSMENT:  1. Fabry disease  2. On medication therapy with stable biomarkers  3. No angiokeratomas  4. At risk for cardiac complications  5. At risk for stroke  6. At risk for renal complications/renal failure    PLAN/RECOMMENDATIONS:  1. Repeat biomarker labs today, lipid panel, metabolic panel.  2. Follow up with cardiology, Dr. Seth Scales, for EKG, echo and MRI.  3. Follow up with nephrologist, for possible iohexol test  4. Pharmacotherapy Consultation for Rare Metabolic Diseases, Dr. John Mcguire  5. Return to [facility] in 12 months.  6. Pharmacotherapy Consultation for Rare Metabolic Diseases, Dr. John Mcguire  7. Return to [facility] in 12 months.  8. There will be an informational meeting with experts who are knowledgeable about your condition --- the annual [employer] event --- on the morning of Saturday, October 24, 2020 at the [facility] ([facility], [address] [place] [address]). You, and your family and friends are invited!     FOLLOW-UP INSTRUCTIONS FOR THE PATIENT:  If you are returning to clinic to review specific laboratory tests, please call the Genetic Counselor (see phone numbers  below)  to confirm that we have received all of the results from reference laboratories prior to your appointment. If we have not received all of the test results, please discuss re-scheduling your appointment.    With warmest regards,      Phuc Chauhan Ph.D., M.D.  Professor of Pediatrics  Medical Director, [facility] Program  Medical Director, PKU and Maternal PKU Clinic    Appointments: [phone]      Monday mornings: [facility] for Lysosomal Diseases Clinic   Monday afternoons: PKU Clinic, Metabolism Clinic, and Genetics Clinic    Nurse Coordinator, Metabolism and Genetics:  Nidhi García RN, [phone]    Pharmacotherapy Consultant:  John Mcguire, PharmD, Pharmacotherapy for Metabolic Disorders (PIMD): [phone]    Genetic Counselor:  Alison Whitney MS, [facility] (Genetic test Results): [phone]    Metabolic Dietician:  Karime Elizondo, Registered Dietician: [phone]    [facility] Clinic Scheduler:  Beth Beltran, [phone]    Copies to:     Chinyere Gonzalez  [address] [place] [address]

## 2020-10-26 NOTE — LETTER
"  10/26/2020      RE: Chinyere Gonzalez  [address] [place] [address]       Chinyere Gonzalez is a 30 year old female who is being evaluated via a billable video visit.      The patient has been notified of following:     \"This video visit will be conducted via a call between you and your physician/provider. We have found that certain health care needs can be provided without the need for an in-person physical exam.  This service lets us provide the care you need with a video conversation.  If a prescription is necessary we can send it directly to your pharmacy.  If lab work is needed we can place an order for that and you can then stop by our lab to have the test done at a later time.    Video visits are billed at different rates depending on your insurance coverage.  Please reach out to your insurance provider with any questions.    If during the course of the call the physician/provider feels a video visit is not appropriate, you will not be charged for this service.\"    Patient has given verbal consent for Video visit? Yes  How would you like to obtain your AVS? Mail a copy  If you are dropped from the video visit, the video invite should be resent to: Send to e-mail at: [email or web address]  Will anyone else be joining your video visit? John Mcguire, Pharm.D., MICHAEL Baltazar, MICHAEL Trejo, SUKHDEV    Video Start: 1:00 pm  Video End:  1:36 pm                [facility]  [facility] [address] [place] [id number]   Phone: [phone]  Fax: [phone]  Date: 2020      Patient:  Chinyere Gonzalez   :   1990   MRN:     [id number]      Chinyere Gonzalez  [address] [place] [address]    Dear Ms. Chinyere Carlos,    Thank you for Attending,  Chinyere Gonzalez, to the [facility] Monday \"Advanced Therapies Clinic\" for consultation and treatment of:    1. Fabry disease (H)        PAST MEDICAL HISTORY:    From the oral history, and medical " records that are available, these items are noted:    Patient Active Problem List   Diagnosis     Angiokeratoma     Fabry disease (H)     Chinyere is a 30 year old female with primary diagnosis of Fabry disease.  She has a documented mutation of A143T and is not on any medication therapies for this condition.  She has no angiokeratomas or GI issues.  She states that she is intolerant to heat and that her feet get numb in the winter due to the cold weather.  She has not issues with pain.  She moved to [place] three years ago and had an EKG performed in [place] in 2016.  She has had no hospitalizations or ED visits since our last visit.        Medications:  No current outpatient medications on file.     No current facility-administered medications for this visit.        Allergies:  Allergies   Allergen Reactions     Inderal [Propranolol]      Latex        Physical Examination:  There were no vitals taken for this visit.  Weight %tile:Facility age limit for growth percentiles is 20 years.  Height %tile: Facility age limit for growth percentiles is 20 years.  Head Circumference %tile: Facility age limit for growth percentiles is 20 years.  BMI %tile: No height and weight on file for this encounter.    FAMILY HISTORY: A brief family medical history was reviewed.  REVIEW OF SYSTEMS: The review of systems negative for new eye, ear, heart, lung, liver, spleen, gastrointestinal, bone, muscle, integumentary, endocrinologic, brain or psychiatric issues except as noted above.  PHYSICAL EXAMINATION:   General: The patient is oriented to person, place and time at an age-appropriate manner.   HEENT: The facial features are normal and symmetric. The ears are of normal position and configuration and hearing is grossly normal.  The lips and the tongue protrudes normally without fasciculations.  Neck: The neck appears to be supple with full range of motion  Chest: The chest is of normal configuration.  Heart: The patient is in no apparent  distress.  Abdomen: Not examined.  Extremities: The extremities are of normal configuration.  Back: Not examined.  Integument: The integument is  of normal appearance without significant changes in pigmentation, birthmarks, or lesions.  Neuromuscular:  Mental Status Exam: Alert, awake. Fully oriented. No dysarthria, no dysphasia. Speech of normal fluency.  Cranial Nerves: PERRLA, EOMs intact, no nystagmus, facial movements symmetric.  Motor: Normal tone in all four extremities, no atrophy or fasciculations. No tremors.  Sensory: Not examined.  Reflexes: Not examined.  Gait: Not examined.    LABORATORY RESULTS: Laboratory studies from the past year were reviewed.  Labs from 04 November 2019:  Plasma GL3 (reference range 1.37-4.04 mcg/mL):  2.88  Lyso GL3 (<0.3 ng/mL):  BQL  Albumin Urine (0-25 mg/g Cr):  8.62  ASSESSMENT:  1. Fabry disease  2. On medication therapy with stable biomarkers  3. No angiokeratomas  4. At risk for cardiac complications  5. At risk for stroke  6. At risk for renal complications/renal failure    PLAN/RECOMMENDATIONS:  1. Repeat biomarker labs today, lipid panel, metabolic panel.  2. Follow up with cardiology, Dr. Seth Scales, for EKG, echo and MRI.  3. Follow up with nephrologist, for possible iohexol test  4. Pharmacotherapy Consultation for Rare Metabolic Diseases, Dr. John Mcguire  5. Return to [facility] in 12 months.  6. Pharmacotherapy Consultation for Rare Metabolic Diseases, Dr. John Mcguire  7. Return to [facility] in 12 months.  8. There will be an informational meeting with experts who are knowledgeable about your condition --- the annual [employer] event --- on the morning of Saturday, October 24, 2020 at the [facility] ([facility], [address] [place] [address]). You, and your family and friends are invited!     FOLLOW-UP INSTRUCTIONS FOR THE PATIENT:  If you are returning to clinic to review  specific laboratory tests, please call the Genetic Counselor (see phone numbers below)  to confirm that we have received all of the results from reference laboratories prior to your appointment. If we have not received all of the test results, please discuss re-scheduling your appointment.    With warmest regards,      Phuc Chauhan Ph.D., M.D.  Professor of Pediatrics  Medical Director, [facility] Program  Medical Director, PKU and Maternal PKU Clinic    Appointments: [phone]      Monday mornings: [facility] for Lysosomal Diseases Clinic   Monday afternoons: PKU Clinic, Metabolism Clinic, and Genetics Clinic    Nurse Coordinator, Metabolism and Genetics:  Nidhi García RN, [phone]    Pharmacotherapy Consultant:  John Mcguire, PharmD, Pharmacotherapy for Metabolic Disorders (PIMD): [phone]    Genetic Counselor:  Alison Whitney MS, [facility] (Genetic test Results): [phone]    Metabolic Dietician:  Karime Elizondo, Registered Dietician: [phone]    [facility] Clinic Scheduler:  Beth Beltran, [phone]    Copies to:     Chinyere Gonzalez  [address] [place] [address]        Bar Chauhan MD

## 2020-11-02 NOTE — TELEPHONE ENCOUNTER
RECORDS RECEIVED FROM: Internal   DATE RECEIVED: 12.09.2020   NOTES STATUS DETAILS   OFFICE NOTE from referring provider Internal 10.26.2020 Bar Chauhan MD   OFFICE NOTE from other specialist  N/A    *Only VASCULITIS or LUPUS gather office notes for the following N/A    *PULMONARY   N/A    *ENT N/A    *DERMATOLOGY N/A    *RHEUMATOLOGY N/A    DISCHARGE SUMMARY from hospital N/A    DISCHARGE REPORT from the ER N/A    MEDICATION LIST N/A    IMAGING  (NEED IMAGES AND REPORTS)     KIDNEY CT SCAN N/A    KIDNEY ULTRASOUND N/A    MR ABDOMEN N/A    NUCLEAR MEDICINE RENAL N/A    LABS     CBC Internal 11.04.2019   CMP Internal 11.04.2019   BMP N/A    UA N/A    URINE PROTEIN N/A    RENAL PANEL N/A    BIOPSY     KIDNEY BIOPSY  N/A

## 2020-12-09 ENCOUNTER — PRE VISIT (OUTPATIENT)
Dept: NEPHROLOGY | Facility: CLINIC | Age: 30
End: 2020-12-09

## 2021-01-15 ENCOUNTER — HEALTH MAINTENANCE LETTER (OUTPATIENT)
Age: 31
End: 2021-01-15

## 2021-10-10 ENCOUNTER — HEALTH MAINTENANCE LETTER (OUTPATIENT)
Age: 31
End: 2021-10-10

## 2021-10-18 ENCOUNTER — VIRTUAL VISIT (OUTPATIENT)
Dept: CONSULT | Facility: CLINIC | Age: 31
End: 2021-10-18
Attending: PEDIATRICS
Payer: COMMERCIAL

## 2021-10-18 DIAGNOSIS — D23.9 ANGIOKERATOMA: ICD-10-CM

## 2021-10-18 DIAGNOSIS — E75.21 FABRY DISEASE (H): Primary | ICD-10-CM

## 2021-10-18 PROCEDURE — 99214 OFFICE O/P EST MOD 30 MIN: CPT | Mod: 95 | Performed by: PEDIATRICS

## 2021-10-18 RX ORDER — MONTELUKAST SODIUM 10 MG/1
TABLET ORAL
COMMUNITY
Start: 2021-09-30

## 2021-10-18 NOTE — PATIENT INSTRUCTIONS
[facility]  [facility]  Pediatric Specialty Clinic ([facility])      Medications/Infusions   NA        Follow up visit    1 year       Helpful Numbers   To schedule appointments:              Beth Beltran: [phone]  Pediatric Call Center for [facility]: [phone]  Radiology/ Imaging/ Echocardiogram: [phone]   Services:   [phone]     For questions about your/ your child's medical condition:            Dr. Bar Chauhan M.D, Ph.D             Dr. Whitney Patel M.D.                 You may call Lizett Handy RN at [phone] and one of the physicians will contact you back    For questions about medications/ supplies:          Dr. John Mcguire Pharm D               Ph: [phone]    For questions about the research program you have enrolled in:           Dr. Emily RUIZBS, CCRP               Ph: [phone]    For questions about genetic counseling or genetic testing:          Merary Bañuelos M.S, [facility]             Ph: [phone]              If you have not already done so consider signing up for [employer]gle by speaking with the person at the  on your way out or go to [email or web address] to sign up online.   Wriggle enables easy and confidential communication with your care team.

## 2021-10-18 NOTE — LETTER
"  10/18/2021      RE: Chinyere Gonzalez  [address] [place] [address]                     [facility]ies  [facility] [address] [place] [address]   Phone: [phone]  Fax: [phone]  Date: 2021      Patient:  Chinyere Gonzalez   :   1990   MRN:     [id number]      Chinyere Gonzalez  [address] [place] [address]    Dear Dr. Christina Wong and Chinyere Gonzalez,    Thank you for sending Chinyere Gonzalez to the [facility] Monday \"Advanced Therapies Clinic\" for consultation and treatment of:    1. Fabry disease (H)    2. Angiokeratoma        PAST MEDICAL HISTORY:    From the oral history, and medical records that are available, these items are noted:    Patient Active Problem List   Diagnosis     Angiokeratoma     Fabry disease (H)       Chinyere was previously seen on 10/20/2020. No major hospitalizations or ER visits since the last visit. She has not had Covid so far and got 2 doses of Moderna vaccine in March. She has had one \"red spot\" on her abdomen which was thought to be an angiokeratomas. No new lesions.    She does not have GI symptoms, numbness/tingling of the extremities, headaches. Chinyere, however, reports the presence of tinnitus b/l for 1 year. She reports a history of ear infection a year ago. She has been given a referral to audiology by her PCP. She reports that she has raynaud's phenomenon which results in b/l lower extremity pain in winter.    Medications:  Current Outpatient Medications   Medication Sig     levonorgestrel (MIRENA) 20 MCG/24HR IUD 1 each by Intrauterine route once     montelukast (SINGULAIR) 10 MG tablet      No current facility-administered medications for this visit.       Allergies:  Allergies   Allergen Reactions     Inderal [Propranolol]      Latex        Physical Examination:  There were no vitals taken for this visit.  Weight %tile:Facility age limit for growth percentiles is 20 years.  Height %tile: Facility age " limit for growth percentiles is 20 years.  Head Circumference %tile: Facility age limit for growth percentiles is 20 years.  BMI %tile: No height and weight on file for this encounter.    FAMILY HISTORY: A brief family medical history was reviewed.  REVIEW OF SYSTEMS: The review of systems negative for new eye, ear, heart, lung, liver, spleen, gastrointestinal, bone, muscle, integumentary, endocrinologic, brain or psychiatric issues except as noted above.  PHYSICAL EXAMINATION:   General: The patient is oriented to person, place and time at an age-appropriate manner.   HEENT: The facial features are normal and symmetric. The ears are of normal position and configuration and hearing is grossly normal.  The oropharynx is benign and the tongue protrudes normally without fasciculations.  Neck: The neck appears to be supple with full range of motion  Chest: Does not appear to be tachypneic or in any respiratory distress.  Heart:  Chinyere Gonzalez  appears well perfused.  Abdomen: Not examined.  Extremities: The extremities are of normal configuration without contractures nor hyperlaxities.  Back: The back was straight without scoliosis.   Integument: The visible part of the integument is of normal appearance without significant changes in pigmentation, birthmarks, or lesions.  Neuromuscular:  Mental Status Exam: Alert, awake. Fully oriented. No dysarthria, no dysphasia. Speech of normal fluency.     LABORATORY RESULTS: Laboratory studies from the past year were reviewed.  11/4/2019:  Lyso GL3- BQL  GL3- 2.88 micrograms/mL (Ref range: 1.37-4.04)  Lipid profile: Normal  ASSESSMENT:  1. Fabry disease  2. Tinnitus b/l  3. Not on any medication  4. Stable biomarkers from 2019  5. At risk for cardiomyopathy and myocardial infarction  6. At risk for stroke    PLAN/RECOMMENDATIONS:  1. Continue monitoring for symptoms of Fabry disease  2. Recommend ENT evaluation for tinnitus  3. Obtain monitoring labs including plasma GL3 and  LysoGL3 every year  4. Individuals with Fabry disease are at risk for stroke and MI due to their underlying disease. It is highly important to reduce the additional risk related to hypercholesterolemia. Recommend PCP to evaluate and treat Chinyere Gonzalez hypercholestrolemia with dietary, lifestyle interventions and medications as needed.  5. Pharmacotherapy Consultation for Rare Metabolic Diseases, Dr. Nadeem Pérez  6. F/u in 1 year  7. There will be an informational meeting with experts who are knowledgeable about your condition --- WORLD symposium --- In February 2021. Please go to [email or web address] for further details. You, and your family and friends are invited!        FOLLOW-UP INSTRUCTIONS FOR THE PATIENT:  If you are returning to clinic to review specific laboratory tests, please call the Genetic Counselor (see phone numbers below)  to confirm that we have received all of the results from reference laboratories prior to your appointment. If we have not received all of the test results, please discuss re-scheduling your appointment.    With warmest regards,      Phuc Chauhan Ph.D., M.D.  Professor of Pediatrics  Medical Director, Advanced Therapies Program  Medical Director, PKU and Maternal PKU Clinic    Appointments: [phone]      Monday mornings: [facility]rapies for Lysosomal Diseases Clinic   Monday afternoons: PKU Clinic, Metabolism Clinic, and Genetics Clinic              [facility] laboratory: [phone]/ [phone]               [facility] laboratory: [phone]  Nurse Coordinator, Metabolism and Genetics:  Lizett Handy RN, [phone]    Pharmacotherapy Consultant:  John Mcguire, PharmD, Pharmacotherapy for Metabolic Disorders (PIMD): [phone]    Genetic Counselor:  Merary Bañuelos MS, [facility] (Genetic test Results): [phone]    Metabolic Dietician:  Karime Elizondo, Registered Dietician: [phone]    [facility]  :  Beth Beltran, [phone]    Copies to:     Dr. Christina [facility]  [facility] [address] [facility] [place] [address]    Chinyere Gonzalez  [address] [place] [address]

## 2021-10-18 NOTE — PROGRESS NOTES
"How would you like to obtain your AVS? Mail a copy  If the video visit is dropped, the invitation should be resent by: Send to e-mail at: [email or web address]  Will anyone else be joining your video visit? No      SUKHDEV Iqbal    Video-Visit Details    Type of service:  Video Visit    Video Start Time: 9:18 AM  Video End Time (time video stopped): 9:45AM    Originating Location (pt. Location): Home    Distant Location (provider location):  [facility]    Mode of Communication:  Video Conference via [employer]                  [facility]  [facility] [address] [place] [address]   Phone: [phone]  Fax: [phone]  Date: 2021      Patient:  Chinyere Gonzalez   :   1990   MRN:     [id number]      Chinyere Gonzalez  [address] [place] [address]    Dear Dr. Christina Wong and Chinyree R Sarahphillip,    Thank you for sending Chinyere Gonzalez to the [facility] Monday \"Advanced Therapies Clinic\" for consultation and treatment of:    1. Fabry disease (H)    2. Angiokeratoma        PAST MEDICAL HISTORY:    From the oral history, and medical records that are available, these items are noted:    Patient Active Problem List   Diagnosis     Angiokeratoma     Fabry disease (H)       Chinyere was previously seen on 10/20/2020. No major hospitalizations or ER visits since the last visit. She has not had Covid so far and got 2 doses of Moderna vaccine in March. She has had one \"red spot\" on her abdomen which was thought to be an angiokeratomas. No new lesions.    She does not have GI symptoms, numbness/tingling of the extremities, headaches. Chinyere, however, reports the presence of tinnitus b/l for 1 year. She reports a history of ear infection a year ago. She has been given a referral to audiology by her PCP. She reports that she has raynaud's phenomenon which results in b/l lower extremity pain in " winter.    Medications:  Current Outpatient Medications   Medication Sig     levonorgestrel (MIRENA) 20 MCG/24HR IUD 1 each by Intrauterine route once     montelukast (SINGULAIR) 10 MG tablet      No current facility-administered medications for this visit.       Allergies:  Allergies   Allergen Reactions     Inderal [Propranolol]      Latex        Physical Examination:  There were no vitals taken for this visit.  Weight %tile:Facility age limit for growth percentiles is 20 years.  Height %tile: Facility age limit for growth percentiles is 20 years.  Head Circumference %tile: Facility age limit for growth percentiles is 20 years.  BMI %tile: No height and weight on file for this encounter.    FAMILY HISTORY: A brief family medical history was reviewed.  REVIEW OF SYSTEMS: The review of systems negative for new eye, ear, heart, lung, liver, spleen, gastrointestinal, bone, muscle, integumentary, endocrinologic, brain or psychiatric issues except as noted above.  PHYSICAL EXAMINATION:   General: The patient is oriented to person, place and time at an age-appropriate manner.   HEENT: The facial features are normal and symmetric. The ears are of normal position and configuration and hearing is grossly normal.  The oropharynx is benign and the tongue protrudes normally without fasciculations.  Neck: The neck appears to be supple with full range of motion  Chest: Does not appear to be tachypneic or in any respiratory distress.  Heart:  Chinyere R Neubert  appears well perfused.  Abdomen: Not examined.  Extremities: The extremities are of normal configuration without contractures nor hyperlaxities.  Back: The back was straight without scoliosis.   Integument: The visible part of the integument is of normal appearance without significant changes in pigmentation, birthmarks, or lesions.  Neuromuscular:  Mental Status Exam: Alert, awake. Fully oriented. No dysarthria, no dysphasia. Speech of normal fluency.     LABORATORY  RESULTS: Laboratory studies from the past year were reviewed.  11/4/2019:  Lyso GL3- BQL  GL3- 2.88 micrograms/mL (Ref range: 1.37-4.04)  Lipid profile: Normal  ASSESSMENT:  1. Fabry disease  2. Tinnitus b/l  3. Not on any medication  4. Stable biomarkers from 2019  5. At risk for cardiomyopathy and myocardial infarction  6. At risk for stroke    PLAN/RECOMMENDATIONS:  1. Continue monitoring for symptoms of Fabry disease  2. Recommend ENT evaluation for tinnitus  3. Obtain monitoring labs including plasma GL3 and LysoGL3 every year  4. Individuals with Fabry disease are at risk for stroke and MI due to their underlying disease. It is highly important to reduce the additional risk related to hypercholesterolemia. Recommend PCP to evaluate and treat Chinyere Gonzalez hypercholestrolemia with dietary, lifestyle interventions and medications as needed.  5. Pharmacotherapy Consultation for Rare Metabolic Diseases, Dr. Nadeem Pérez  6. F/u in 1 year  7. There will be an informational meeting with experts who are knowledgeable about your condition --- WORLD symposium --- In February 2021. Please go to [email or web address] for further details. You, and your family and friends are invited!        FOLLOW-UP INSTRUCTIONS FOR THE PATIENT:  If you are returning to clinic to review specific laboratory tests, please call the Genetic Counselor (see phone numbers below)  to confirm that we have received all of the results from reference laboratories prior to your appointment. If we have not received all of the test results, please discuss re-scheduling your appointment.    With warmest regards,      Phuc Chauhan Ph.D., M.D.  Professor of Pediatrics  Medical Director, Advanced Therapies Program  Medical Director, PKU and Maternal PKU Clinic    Appointments: [phone]      Monday mornings: [facility]rapies for Lysosomal Diseases Clinic   Monday afternoons: PKU Clinic, Metabolism Clinic, and Genetics Clinic               [facility] laboratory: [phone]/ [phone]               [facility] laboratory: [phone]  Nurse Coordinator, Metabolism and Genetics:  Lizett Handy RN, [phone]    Pharmacotherapy Consultant:  John Mcguire, PharmD, Pharmacotherapy for Metabolic Disorders (PIMD): [phone]    Genetic Counselor:  Merary Bañuelos MS, [facility] [place] (Genetic test Results): [phone]    Metabolic Dietician:  Karime Elizondo, Registered Dietician: [phone]    Advanced [facility]rapies Clinic Scheduler:  Beth Beltran, [phone]    Copies to:     [facility]  [facility] [address] [facility] [address]    Chinyere Gonzalez  [address] [place] [address]

## 2022-01-30 ENCOUNTER — HEALTH MAINTENANCE LETTER (OUTPATIENT)
Age: 32
End: 2022-01-30

## 2022-09-18 ENCOUNTER — HEALTH MAINTENANCE LETTER (OUTPATIENT)
Age: 32
End: 2022-09-18

## 2022-10-24 ENCOUNTER — VIRTUAL VISIT (OUTPATIENT)
Dept: CONSULT | Facility: CLINIC | Age: 32
End: 2022-10-24
Attending: PEDIATRICS
Payer: COMMERCIAL

## 2022-10-24 ENCOUNTER — TELEPHONE (OUTPATIENT)
Dept: CARDIOLOGY | Facility: CLINIC | Age: 32
End: 2022-10-24

## 2022-10-24 VITALS — WEIGHT: 155 LBS | BODY MASS INDEX: 23.28 KG/M2

## 2022-10-24 DIAGNOSIS — E75.21 FABRY DISEASE (H): Primary | ICD-10-CM

## 2022-10-24 PROCEDURE — 99213 OFFICE O/P EST LOW 20 MIN: CPT | Mod: 95 | Performed by: PEDIATRICS

## 2022-10-24 ASSESSMENT — PAIN SCALES - GENERAL: PAINLEVEL: NO PAIN (0)

## 2022-10-24 NOTE — TELEPHONE ENCOUNTER
"[facility] Call Center    Phone Message    May a detailed message be left on voicemail: yes     Reason for Call: Appointment Intake    Referring Provider Name: Whitney Patel MD  Diagnosis and/or Symptoms: Fabry disease (H) [E75.21] --per pt does have hx of afib as a dx as well       Per order \"Please schedule with Dr. Ubaldo Mcgill only\" --Dr. Mcgill not under this dx please review if pt to see Congenital/Genetic MD or ok to see Dr. Mcgill pt aware of this as well will wait for clinic to review and call her back to schedule correctly.    Action Taken: Message routed to:  Other: Cardiology    Travel Screening: Not Applicable                                                                        "

## 2022-10-24 NOTE — LETTER
"10/24/2022      RE: Chinyere Gonzalez  [address] [place] [address]     Dear Colleague,    Thank you for the opportunity to participate in the care of your patient, Chinyere Gonzalez, at the [facility] at [facility]. Please see a copy of my visit note below.                Advanced [facility]  [facility] [address] [place] [address]   Phone: [phone]  Fax: [phone]  Date: 2022      Patient:  Chinyere Gonzalez   :   1990   MRN:     [id number]      Chinyere R Carlos  [address] [place] [address]    Dear  Physician Olivia Ref-Primary and Chinyere Gonzalez,    Thank you for sending Chinyere Gonzalez to the [facility] Monday \"Advanced Therapies Clinic\" for consultation and treatment of:    1. Fabry disease (H)        PAST MEDICAL HISTORY:    From the oral history, and medical records that are available, these items are noted:    Patient Active Problem List   Diagnosis     Angiokeratoma     Fabry disease (H)       Chinyere was seen in the clinic 1 year ago. No major hospitalizations or surgeries since then. Chinyere does not report any GI symptoms (diarrhea/bloating/constipation), headaches, sweating issues, angiokeratomas, exercise intolerance or peripheral neuropathy symptoms. She reports a normal echocardiogram in  through [facility]. However, she has not had any cardiac MRI/imaging since then.    She does not have any questions or concerns.    Medications:  Current Outpatient Medications   Medication Sig     levonorgestrel (MIRENA) 20 MCG/24HR IUD 1 each by Intrauterine route once     montelukast (SINGULAIR) 10 MG tablet      No current facility-administered medications for this visit.       Allergies:  Allergies   Allergen Reactions     Inderal [Propranolol]      Latex        Physical Examination:  Weight 155 lb (70.3 kg).  Weight %tile:Facility age limit for growth " percentiles is 20 years.  Height %tile: Facility age limit for growth percentiles is 20 years.  Head Circumference %tile: Facility age limit for growth percentiles is 20 years.  BMI %tile: Facility age limit for growth percentiles is 20 years.    FAMILY HISTORY: A brief family medical history was reviewed.  REVIEW OF SYSTEMS: The review of systems negative for new eye, ear, heart, lung, liver, spleen, gastrointestinal, bone, muscle, integumentary, endocrinologic, brain or psychiatric issues except as noted above.  PHYSICAL EXAMINATION:   General: The patient is oriented to person, place and time at an age-appropriate manner.   HEENT: The facial features are normal and symmetric. The ears are of normal position and configuration and hearing is grossly normal.  Neck: The neck appears to have full range of motion  Chest: Does not appear to be tachypneic or in any respiratory distress.  Heart:  Chinyere  appears well perfused.  Abdomen: Not examined.  Extremities: The extremities are of normal configuration without contractures nor hyperlaxities.  Back: Not examined.   Integument: The visible part of the integument is of normal appearance without significant changes in pigmentation, birthmarks, or lesions.  Neuromuscular:  Mental Status Exam: Alert, awake. Fully oriented. No dysarthria, no dysphasia. Speech of normal fluency.  Appears to have normal strength.    LABORATORY RESULTS: Laboratory studies from the past year were reviewed.  11/4/2019:  GL-3: 2.88 (ref range: 1.37-4.04)  ASSESSMENT:  1. Fabry disease  2. Not on ERT/[place]  3. Stable biomarkers  4. At risk for cardiomyopathy and myocardial infarction  5. At risk for stroke    PLAN/RECOMMENDATIONS:  1.  Obtain monitoring labs with this visit  2.  Recommend obtaining biomarkers plasma GL3 and LysoGL3 annually  3.  Individuals with Fabry disease are at risk for stroke and MI due to their underlying disease. It is highly important to reduce the additional risk  related to hypercholesterolemia. Recommend PCP to evaluate and treat Chinyere Gonzalez hypercholestrolemia with dietary, lifestyle interventions and medications as needed.  4. Regular cardiology evaluation by Dr. Jayden Mcgill with cardiac MRI/echocardioram and ekg every 1-2 years  5. Pharmacotherapy Consultation for Rare Metabolic Diseases, Dr. John Mcguire  6. Follow-up in 12 months  7. There will be an informational meeting with experts who are knowledgeable about your condition --- WORLDSymposium --- In February 2023. Please go to [email or web address] for further details. You, and your family and friends are invited!       FOLLOW-UP INSTRUCTIONS FOR THE PATIENT:  If you are returning to clinic to review specific laboratory tests, please call the Genetic Counselor (see phone numbers below)  to confirm that we have received all of the results from reference laboratories prior to your appointment. If we have not received all of the test results, please discuss re-scheduling your appointment.    With warmest regards,      Phuc Chauhan Ph.D., M.D.  Professor of Pediatrics  Medical Director, Advanced Therapies Program  Medical Director, PKU and Maternal PKU Clinic    Appointments: [phone]      Monday mornings: [facility] Therapies for Lysosomal Diseases Clinic   Tuesday amornings: PKU Clinic, Metabolism Clinic, and Genetics Clinic              [facility] laboratory: [phone]/ [phone]               [facility] laboratory: [phone]  Nurse Coordinator, Metabolism and Genetics:  Lizett Handy RN, [phone]    Pharmacotherapy Consultant:  John Mcguire, PharmD, Pharmacotherapy for Metabolic Disorders (PIMD): [phone]    Genetic Counselor:  Merary Bañuelos MS, [facility] (Genetic test Results): [phone]    Metabolic Dietician:  Karime Elizondo, Registered Dietician: [phone]    Advanced Therapies Clinic Scheduler:  Beth Beltran, [phone]    Copies to:     Chinyere RENAE  Carlos  [address] [place] [address]

## 2022-10-24 NOTE — PROGRESS NOTES
"Chinyere is a 32 year old who is being evaluated via a billable video visit.      How would you like to obtain your AVS? MyChart  If the video visit is dropped, the invitation should be resent by: Send to e-mail at: [email or web address]  Will anyone else be joining your video visit? No        Video-Visit Details    Video Start Time: 9:09 AM    Type of service:  Video Visit    Video End Time:9:26 AM    Originating Location (pt. Location): Home    Distant Location (provider location):  Off-site    Platform used for Video Visit: [facility]                   Advanced Therapies  [facility] [address] [place] [address]   Phone: [phone]  Fax: [phone]  Date: 2022      Patient:  Chinyere Gonzalez   :   1990   MRN:     [id number]      Chinyere Gonzalez  [address] [place] [address]    Dear  Physician No Ref-Primary and Chinyere Gonzalez,    Thank you for sending Chinyere Gonzalez to the [facility] Monday \"Advanced Therapies Clinic\" for consultation and treatment of:    1. Fabry disease (H)        PAST MEDICAL HISTORY:    From the oral history, and medical records that are available, these items are noted:    Patient Active Problem List   Diagnosis     Angiokeratoma     Fabry disease (H)       Chinyere was seen in the clinic 1 year ago. No major hospitalizations or surgeries since then. Chinyere does not report any GI symptoms (diarrhea/bloating/constipation), headaches, sweating issues, angiokeratomas, exercise intolerance or peripheral neuropathy symptoms. She reports a normal echocardiogram in  through [facility]. However, she has not had any cardiac MRI/imaging since then.    She does not have any questions or concerns.    Medications:  Current Outpatient Medications   Medication Sig     levonorgestrel (MIRENA) 20 MCG/24HR IUD 1 each by Intrauterine route once     montelukast (SINGULAIR) 10 MG tablet      No current facility-administered medications for this " visit.       Allergies:  Allergies   Allergen Reactions     Inderal [Propranolol]      Latex        Physical Examination:  Weight 155 lb (70.3 kg).  Weight %tile:Facility age limit for growth percentiles is 20 years.  Height %tile: Facility age limit for growth percentiles is 20 years.  Head Circumference %tile: Facility age limit for growth percentiles is 20 years.  BMI %tile: Facility age limit for growth percentiles is 20 years.    FAMILY HISTORY: A brief family medical history was reviewed.  REVIEW OF SYSTEMS: The review of systems negative for new eye, ear, heart, lung, liver, spleen, gastrointestinal, bone, muscle, integumentary, endocrinologic, brain or psychiatric issues except as noted above.  PHYSICAL EXAMINATION:   General: The patient is oriented to person, place and time at an age-appropriate manner.   HEENT: The facial features are normal and symmetric. The ears are of normal position and configuration and hearing is grossly normal.  Neck: The neck appears to have full range of motion  Chest: Does not appear to be tachypneic or in any respiratory distress.  Heart:  Chinyere  appears well perfused.  Abdomen: Not examined.  Extremities: The extremities are of normal configuration without contractures nor hyperlaxities.  Back: Not examined.   Integument: The visible part of the integument is of normal appearance without significant changes in pigmentation, birthmarks, or lesions.  Neuromuscular:  Mental Status Exam: Alert, awake. Fully oriented. No dysarthria, no dysphasia. Speech of normal fluency.  Appears to have normal strength.    LABORATORY RESULTS: Laboratory studies from the past year were reviewed.  11/4/2019:  GL-3: 2.88 (ref range: 1.37-4.04)  ASSESSMENT:  1. Fabry disease  2. Not on ERT/[place]  3. Stable biomarkers  4. At risk for cardiomyopathy and myocardial infarction  5. At risk for stroke    PLAN/RECOMMENDATIONS:  1.  Obtain monitoring labs with this visit  2.  Recommend obtaining  biomarkers plasma GL3 and LysoGL3 annually  3.  Individuals with Fabry disease are at risk for stroke and MI due to their underlying disease. It is highly important to reduce the additional risk related to hypercholesterolemia. Recommend PCP to evaluate and treat Chinyere Gonzalez hypercholestrolemia with dietary, lifestyle interventions and medications as needed.  4. Regular cardiology evaluation by Dr. Jayden Mcgill with cardiac MRI/echocardioram and ekg every 1-2 years  5. Pharmacotherapy Consultation for Rare Metabolic Diseases, Dr. John Mcguire  6. Follow-up in 12 months  7. There will be an informational meeting with experts who are knowledgeable about your condition --- [email or web address] --- In February 2023. Please go to [email or web address] for further details. You, and your family and friends are invited!       FOLLOW-UP INSTRUCTIONS FOR THE PATIENT:  If you are returning to clinic to review specific laboratory tests, please call the Genetic Counselor (see phone numbers below)  to confirm that we have received all of the results from reference laboratories prior to your appointment. If we have not received all of the test results, please discuss re-scheduling your appointment.    With warmest regards,      Phuc Chauhan Ph.D., M.D.  Professor of Pediatrics  Medical Director, Advanced Therapies Program  Medical Director, PKU and Maternal PKU Clinic    Appointments: [phone]      Monday mornings: Advanced Therapies for Lysosomal Diseases Clinic   Tuesday amornings: PKU Clinic, Metabolism Clinic, and Genetics Clinic              [facility] laboratory: [phone]/ [phone]               [facility] laboratory: [phone]  Nurse Coordinator, Metabolism and Genetics:  Lizett Handy RN, [phone]    Pharmacotherapy Consultant:  John Mcguire, PharmD, Pharmacotherapy for Metabolic Disorders ([facility]): [phone]    Genetic Counselor:  Merary Bañuelos MS, [facility] (Genetic test  Results): [phone]    Metabolic Dietician:  Karime Elizondo, Registered Dietician: [phone]    Advanced Therapies Clinic Scheduler:  Beth Beltran, [phone]    Copies to:     Chinyere Gonzalez  [address] [place] [address]

## 2022-10-27 NOTE — TELEPHONE ENCOUNTER
Date: 10/27/2022    Time of Call: 4:13 PM     Diagnosis:  Fabry's     [ TORB ] Ordering provider: Jayden Mcgill MD  Order: cardiac MRI with contrast and stress     Order received by: Margarita Rodriguez RN      Follow-up/additional notes: sent to scheduling and left VM about testing and gave direct line    Referral from Dr Millard

## 2022-12-05 ENCOUNTER — LAB (OUTPATIENT)
Dept: LAB | Facility: CLINIC | Age: 32
End: 2022-12-05
Attending: PEDIATRICS
Payer: COMMERCIAL

## 2022-12-05 DIAGNOSIS — E75.21 FABRY DISEASE (H): ICD-10-CM

## 2022-12-05 LAB
ALBUMIN SERPL-MCNC: 4.3 G/DL (ref 3.4–5)
ALP SERPL-CCNC: 52 U/L (ref 40–150)
ALT SERPL W P-5'-P-CCNC: 18 U/L (ref 0–50)
ANION GAP SERPL CALCULATED.3IONS-SCNC: 5 MMOL/L (ref 3–14)
AST SERPL W P-5'-P-CCNC: 14 U/L (ref 0–45)
BASOPHILS # BLD AUTO: 0 10E3/UL (ref 0–0.2)
BASOPHILS NFR BLD AUTO: 0 %
BILIRUB SERPL-MCNC: 1.4 MG/DL (ref 0.2–1.3)
BUN SERPL-MCNC: 14 MG/DL (ref 7–30)
CALCIUM SERPL-MCNC: 9.6 MG/DL (ref 8.5–10.1)
CHLORIDE BLD-SCNC: 106 MMOL/L (ref 94–109)
CHOLEST SERPL-MCNC: 188 MG/DL
CO2 SERPL-SCNC: 27 MMOL/L (ref 20–32)
CREAT SERPL-MCNC: 0.68 MG/DL (ref 0.52–1.04)
CREAT UR-MCNC: 36 MG/DL
EOSINOPHIL # BLD AUTO: 0.1 10E3/UL (ref 0–0.7)
EOSINOPHIL NFR BLD AUTO: 3 %
ERYTHROCYTE [DISTWIDTH] IN BLOOD BY AUTOMATED COUNT: 12.6 % (ref 10–15)
FASTING STATUS PATIENT QL REPORTED: ABNORMAL
GFR SERPL CREATININE-BSD FRML MDRD: >90 ML/MIN/1.73M2
GLUCOSE BLD-MCNC: 77 MG/DL (ref 70–99)
HCT VFR BLD AUTO: 39.1 % (ref 35–47)
HDLC SERPL-MCNC: 67 MG/DL
HGB BLD-MCNC: 13.1 G/DL (ref 11.7–15.7)
IMM GRANULOCYTES # BLD: 0 10E3/UL
IMM GRANULOCYTES NFR BLD: 0 %
LDLC SERPL CALC-MCNC: 110 MG/DL
LYMPHOCYTES # BLD AUTO: 1.3 10E3/UL (ref 0.8–5.3)
LYMPHOCYTES NFR BLD AUTO: 27 %
MCH RBC QN AUTO: 30 PG (ref 26.5–33)
MCHC RBC AUTO-ENTMCNC: 33.5 G/DL (ref 31.5–36.5)
MCV RBC AUTO: 90 FL (ref 78–100)
MICROALBUMIN UR-MCNC: <5 MG/L
MICROALBUMIN/CREAT UR: NORMAL MG/G{CREAT}
MONOCYTES # BLD AUTO: 0.4 10E3/UL (ref 0–1.3)
MONOCYTES NFR BLD AUTO: 7 %
NEUTROPHILS # BLD AUTO: 3.1 10E3/UL (ref 1.6–8.3)
NEUTROPHILS NFR BLD AUTO: 63 %
NONHDLC SERPL-MCNC: 121 MG/DL
NRBC # BLD AUTO: 0 10E3/UL
NRBC BLD AUTO-RTO: 0 /100
PLATELET # BLD AUTO: 194 10E3/UL (ref 150–450)
POTASSIUM BLD-SCNC: 4.1 MMOL/L (ref 3.4–5.3)
PROT SERPL-MCNC: 7.6 G/DL (ref 6.8–8.8)
RBC # BLD AUTO: 4.37 10E6/UL (ref 3.8–5.2)
SODIUM SERPL-SCNC: 138 MMOL/L (ref 133–144)
TRIGL SERPL-MCNC: 57 MG/DL
TSH SERPL DL<=0.005 MIU/L-ACNC: 1.15 MU/L (ref 0.4–4)
WBC # BLD AUTO: 4.9 10E3/UL (ref 4–11)

## 2022-12-05 PROCEDURE — 85025 COMPLETE CBC W/AUTO DIFF WBC: CPT

## 2022-12-05 PROCEDURE — 82043 UR ALBUMIN QUANTITATIVE: CPT

## 2022-12-05 PROCEDURE — 36415 COLL VENOUS BLD VENIPUNCTURE: CPT

## 2022-12-05 PROCEDURE — 84443 ASSAY THYROID STIM HORMONE: CPT

## 2022-12-05 PROCEDURE — 80061 LIPID PANEL: CPT

## 2022-12-05 PROCEDURE — 80053 COMPREHEN METABOLIC PANEL: CPT

## 2022-12-16 LAB
SCANNED LAB RESULT: NORMAL
SCANNED LAB RESULT: NORMAL

## 2023-05-07 ENCOUNTER — HEALTH MAINTENANCE LETTER (OUTPATIENT)
Age: 33
End: 2023-05-07

## 2023-12-27 ENCOUNTER — TELEPHONE (OUTPATIENT)
Dept: CONSULT | Facility: CLINIC | Age: 33
End: 2023-12-27
Payer: COMMERCIAL

## 2023-12-27 NOTE — TELEPHONE ENCOUNTER
I left Chinyere a voice message in regards of coordinating a follow-up virtual visit in the [facility] Therapy Clinic to meet with Dr Chauhan. Chinyere and her two boys are overdue for their annual visit in the [facility].     ?     To schedule an appointment, please contact Dr. Chauhan s coordinator, Beth, at [phone]. She will be happy to help coordinate an appointment with you?     ?          Thank you,      Beth Beltran  Specialty    [facility]?  [facility] [place]?   [address] [place] [address]   [email or web address]   Office: [phone]

## 2024-01-30 ENCOUNTER — TELEPHONE (OUTPATIENT)
Dept: CONSULT | Facility: CLINIC | Age: 34
End: 2024-01-30
Payer: COMMERCIAL

## 2024-01-30 NOTE — TELEPHONE ENCOUNTER
This is my second attempt on trying to reach Chinyere. Chinyere is overdue for her annual recommended appointment in the [facility].     To schedule an appointment, please contact the coordinator at [phone].     Thank you,   Beth Beltran

## 2024-07-14 ENCOUNTER — HEALTH MAINTENANCE LETTER (OUTPATIENT)
Age: 34
End: 2024-07-14

## 2025-01-20 ENCOUNTER — LAB REQUISITION (OUTPATIENT)
Dept: LAB | Facility: CLINIC | Age: 35
End: 2025-01-20
Payer: COMMERCIAL

## 2025-01-20 DIAGNOSIS — Z12.4 ENCOUNTER FOR SCREENING FOR MALIGNANT NEOPLASM OF CERVIX: ICD-10-CM

## 2025-01-20 PROCEDURE — 87624 HPV HI-RISK TYP POOLED RSLT: CPT | Performed by: ADVANCED PRACTICE MIDWIFE

## 2025-01-20 PROCEDURE — 87624 HPV HI-RISK TYP POOLED RSLT: CPT | Mod: ORL | Performed by: ADVANCED PRACTICE MIDWIFE

## 2025-01-20 PROCEDURE — G0145 SCR C/V CYTO,THINLAYER,RESCR: HCPCS | Mod: ORL | Performed by: ADVANCED PRACTICE MIDWIFE

## 2025-01-21 LAB
HPV HR 12 DNA CVX QL NAA+PROBE: NEGATIVE
HPV16 DNA CVX QL NAA+PROBE: NEGATIVE
HPV18 DNA CVX QL NAA+PROBE: NEGATIVE
HUMAN PAPILLOMA VIRUS FINAL DIAGNOSIS: NORMAL

## 2025-01-23 LAB
BKR AP ASSOCIATED HPV REPORT: NORMAL
BKR LAB AP GYN ADEQUACY: NORMAL
BKR LAB AP GYN INTERPRETATION: NORMAL
BKR LAB AP LMP: NORMAL
BKR LAB AP PREVIOUS ABNL DX: NORMAL
BKR LAB AP PREVIOUS ABNORMAL: NORMAL
PATH REPORT.COMMENTS IMP SPEC: NORMAL
PATH REPORT.COMMENTS IMP SPEC: NORMAL
PATH REPORT.RELEVANT HX SPEC: NORMAL

## 2025-07-19 ENCOUNTER — HEALTH MAINTENANCE LETTER (OUTPATIENT)
Age: 35
End: 2025-07-19